# Patient Record
Sex: MALE | Race: WHITE | NOT HISPANIC OR LATINO | Employment: UNEMPLOYED | URBAN - METROPOLITAN AREA
[De-identification: names, ages, dates, MRNs, and addresses within clinical notes are randomized per-mention and may not be internally consistent; named-entity substitution may affect disease eponyms.]

---
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
## 2021-05-11 ENCOUNTER — IMMUNIZATIONS (OUTPATIENT)
Dept: FAMILY MEDICINE CLINIC | Facility: HOSPITAL | Age: 21
End: 2021-05-11

## 2021-05-11 DIAGNOSIS — Z23 ENCOUNTER FOR IMMUNIZATION: Primary | ICD-10-CM

## 2021-05-11 PROCEDURE — 0001A SARS-COV-2 / COVID-19 MRNA VACCINE (PFIZER-BIONTECH) 30 MCG: CPT

## 2021-05-11 PROCEDURE — 91300 SARS-COV-2 / COVID-19 MRNA VACCINE (PFIZER-BIONTECH) 30 MCG: CPT

## 2021-06-03 ENCOUNTER — IMMUNIZATIONS (OUTPATIENT)
Dept: FAMILY MEDICINE CLINIC | Facility: HOSPITAL | Age: 21
End: 2021-06-03

## 2021-06-03 DIAGNOSIS — Z23 ENCOUNTER FOR IMMUNIZATION: Primary | ICD-10-CM

## 2021-06-03 PROCEDURE — 0002A SARS-COV-2 / COVID-19 MRNA VACCINE (PFIZER-BIONTECH) 30 MCG: CPT

## 2021-06-03 PROCEDURE — 91300 SARS-COV-2 / COVID-19 MRNA VACCINE (PFIZER-BIONTECH) 30 MCG: CPT

## 2022-12-23 ENCOUNTER — OFFICE VISIT (OUTPATIENT)
Dept: URGENT CARE | Facility: CLINIC | Age: 22
End: 2022-12-23

## 2022-12-23 VITALS
WEIGHT: 134 LBS | OXYGEN SATURATION: 98 % | SYSTOLIC BLOOD PRESSURE: 113 MMHG | HEART RATE: 90 BPM | RESPIRATION RATE: 16 BRPM | TEMPERATURE: 98 F | DIASTOLIC BLOOD PRESSURE: 72 MMHG | BODY MASS INDEX: 22.88 KG/M2 | HEIGHT: 64 IN

## 2022-12-23 DIAGNOSIS — H92.03 OTALGIA OF BOTH EARS: ICD-10-CM

## 2022-12-23 DIAGNOSIS — J02.9 SORE THROAT: Primary | ICD-10-CM

## 2022-12-23 PROBLEM — J30.2 SEASONAL ALLERGIES: Status: ACTIVE | Noted: 2022-12-23

## 2022-12-23 LAB — S PYO AG THROAT QL: NEGATIVE

## 2022-12-23 NOTE — PROGRESS NOTES
[facility]        NAME: Oly Somers is a 25 y o  male  : 2000    MRN: [id number]  DATE: 2022  TIME: 2:02 PM    Assessment and Plan   Sore throat [J02 9]  1  Sore throat  POCT rapid strepA    Throat culture      2  Otalgia of both ears              Patient Instructions     Patient Instructions   Rapid strep negative  Will send throat culture  If positive, will call you and discuss antibiotics  Fluids and rest  Cool mist humidifier at night  Salt water gargles and chloraseptic spray  Tea with honey  Tylenol/Ibuprofen for pain/fever  Follow up with PCP if symptoms do not improve or worsen  Follow up with PCP in 3-5 days  Proceed to  ER if symptoms worsen  Chief Complaint     Chief Complaint   Patient presents with   • Earache     10 days, ear pain, sore throat  Tylenol/ motrin  History of Present Illness       HPI   Oly Somers is a 25 y o  male who presents for evaluation of b/l ear pain and sore throat that started 10 days ago  He had a cough and congestion last week, but has since resolved  Denies fevers, chills, SOB, wheezing, abdominal pain, N/V/D  He has been taking Tylenol/Motrin for his symptoms with mild relief  He reports multiple family members sick with URI symptoms  He is traveling to [facility] in 4 days and just wanted to be checked  Review of Systems   Review of Systems   Constitutional: Negative for chills, fatigue and fever  HENT: Positive for ear pain and sore throat  Negative for congestion, rhinorrhea and sinus pain  Respiratory: Negative for cough, chest tightness, shortness of breath and wheezing  Cardiovascular: Negative for chest pain and palpitations  Gastrointestinal: Negative for abdominal pain, diarrhea, nausea and vomiting  Musculoskeletal: Negative for arthralgias and myalgias  Skin: Negative for color change and rash  Neurological: Negative for weakness, light-headedness and headaches           Current Medications Current Outpatient Medications:   •  albuterol (PROVENTIL HFA,VENTOLIN HFA) 90 mcg/act inhaler, Inhale 2 puffs every 4 (four) hours as needed for wheezing (Patient not taking: Reported on 12/23/2022), Disp: 1 Inhaler, Rfl: 0    Current Allergies     Allergies as of 12/23/2022   • (No Known Allergies)            The following portions of the patient's history were reviewed and updated as appropriate: allergies, current medications, past family history, past medical history, past social history, past surgical history and problem list      Past Medical History:   Diagnosis Date   • No pertinent past medical history        Past Surgical History:   Procedure Laterality Date   • ADENOIDECTOMY     • TONSILLECTOMY     • WISDOM TOOTH EXTRACTION         History reviewed  No pertinent family history  Medications have been verified  Objective   /72   Pulse 90   Temp 98 °F (36 7 °C)   Resp 16   Ht 5' 4" (1 626 m)   Wt 60 8 kg (134 lb)   SpO2 98%   BMI 23 00 kg/m²        Physical Exam     Physical Exam  Vitals and nursing note reviewed  Constitutional:       General: He is not in acute distress  Appearance: Normal appearance  HENT:      Head: Normocephalic and atraumatic  Right Ear: Tympanic membrane and ear canal normal       Left Ear: Tympanic membrane and ear canal normal       Nose: Nose normal       Mouth/Throat:      Mouth: Mucous membranes are moist       Pharynx: Posterior oropharyngeal erythema present  No oropharyngeal exudate  Comments: H/o tonsillectomy  Eyes:      Conjunctiva/sclera: Conjunctivae normal       Pupils: Pupils are equal, round, and reactive to light  Cardiovascular:      Rate and Rhythm: Normal rate and regular rhythm  Pulses: Normal pulses  Heart sounds: Normal heart sounds  No murmur heard  Pulmonary:      Effort: Pulmonary effort is normal       Breath sounds: Normal breath sounds  No wheezing, rhonchi or rales     Musculoskeletal: Cervical back: Normal range of motion and neck supple  Lymphadenopathy:      Cervical: No cervical adenopathy  Skin:     General: Skin is warm and dry  Neurological:      Mental Status: He is alert

## 2022-12-23 NOTE — PATIENT INSTRUCTIONS
Rapid strep negative  Will send throat culture  If positive, will call you and discuss antibiotics  Fluids and rest  Cool mist humidifier at night  Salt water gargles and chloraseptic spray  Tea with honey  Tylenol/Ibuprofen for pain/fever  Follow up with PCP if symptoms do not improve or worsen

## 2022-12-25 LAB — BACTERIA THROAT CULT: NORMAL

## 2023-11-22 ENCOUNTER — OFFICE VISIT (OUTPATIENT)
Dept: URGENT CARE | Facility: CLINIC | Age: 23
End: 2023-11-22
Payer: COMMERCIAL

## 2023-11-22 VITALS
HEART RATE: 81 BPM | TEMPERATURE: 99 F | WEIGHT: 135 LBS | DIASTOLIC BLOOD PRESSURE: 76 MMHG | RESPIRATION RATE: 18 BRPM | OXYGEN SATURATION: 98 % | BODY MASS INDEX: 23.17 KG/M2 | SYSTOLIC BLOOD PRESSURE: 120 MMHG

## 2023-11-22 DIAGNOSIS — R10.13 EPIGASTRIC PAIN: Primary | ICD-10-CM

## 2023-11-22 PROCEDURE — 99213 OFFICE O/P EST LOW 20 MIN: CPT | Performed by: PHYSICIAN ASSISTANT

## 2023-11-22 RX ORDER — FAMOTIDINE 20 MG/1
20 TABLET, FILM COATED ORAL 2 TIMES DAILY
Qty: 20 TABLET | Refills: 0 | Status: SHIPPED | OUTPATIENT
Start: 2023-11-22 | End: 2023-12-02

## 2023-11-22 NOTE — PROGRESS NOTES
[facility]        NAME: Valdemar Eng is a 21 y.o. male  : 2000    MRN: [id number]  DATE: 2023  TIME: 11:01 AM    Assessment and Plan   Epigastric pain [R10.13]  1. Epigastric pain  famotidine (PEPCID) 20 mg tablet            Patient Instructions     Epigastric pain  Pepcid as directed  Follow-up with primary care doctor  Follow up with PCP in 3-5 days. Proceed to  ER if symptoms worsen. Chief Complaint     Chief Complaint   Patient presents with    Abdominal Pain     Upper central abdominal discomfort constant yesterday for 2-3 hours then went away. States its more right sided. Denies nausea and vomiting. Pains started a week ago on and off. Not taking anything for the discomfort. Pain rated 4-5/10 currently         History of Present Illness       27-year-old male who presents complaining of epigastric pain that is intermittent in nature. Patient does not recall any inciting factors. Denies nausea, vomiting, fever, chills, urinary symptoms    Abdominal Pain        Review of Systems   Review of Systems   Gastrointestinal:  Positive for abdominal pain.          Current Medications       Current Outpatient Medications:     famotidine (PEPCID) 20 mg tablet, Take 1 tablet (20 mg total) by mouth 2 (two) times a day for 10 days, Disp: 20 tablet, Rfl: 0    albuterol (PROVENTIL HFA,VENTOLIN HFA) 90 mcg/act inhaler, Inhale 2 puffs every 4 (four) hours as needed for wheezing (Patient not taking: Reported on 2022), Disp: 1 Inhaler, Rfl: 0    Current Allergies     Allergies as of 2023 - Reviewed 2023   Allergen Reaction Noted    Pollen extract Allergic Rhinitis 2018            The following portions of the patient's history were reviewed and updated as appropriate: allergies, current medications, past family history, past medical history, past social history, past surgical history and problem list.     Past Medical History:   Diagnosis Date    No pertinent past medical history        Past Surgical History:   Procedure Laterality Date    ADENOIDECTOMY      TONSILLECTOMY      WISDOM TOOTH EXTRACTION         History reviewed. No pertinent family history. Medications have been verified. Objective   /76   Pulse 81   Temp 99 °F (37.2 °C)   Resp 18   Wt 61.2 kg (135 lb)   SpO2 98%   BMI 23.17 kg/m²        Physical Exam     Physical Exam  Constitutional:       General: He is not in acute distress. Appearance: He is well-developed. He is not diaphoretic. Cardiovascular:      Rate and Rhythm: Normal rate and regular rhythm. Heart sounds: Normal heart sounds. Pulmonary:      Effort: Pulmonary effort is normal. No respiratory distress. Breath sounds: Normal breath sounds. No wheezing or rales. Chest:      Chest wall: No tenderness. Abdominal:      General: Abdomen is flat. Palpations: Abdomen is soft. Tenderness: There is abdominal tenderness in the epigastric area. There is no right CVA tenderness, left CVA tenderness, guarding or rebound. Negative signs include Moseley's sign, Rovsing's sign, McBurney's sign and psoas sign. Musculoskeletal:      Cervical back: Normal range of motion and neck supple. Lymphadenopathy:      Cervical: No cervical adenopathy.

## 2023-11-22 NOTE — PATIENT INSTRUCTIONS
Epigastric pain  Pepcid as directed  Follow-up with primary care doctor  Follow up with PCP in 3-5 days. Proceed to  ER if symptoms worsen. Abdominal Binder   WHAT YOU NEED TO KNOW:   An abdominal binder is fitted elastic material that goes around your abdomen. It may be used to support your muscles. It also keeps bandages in place, or helps incisions heal after abdominal or pelvic surgery. DISCHARGE INSTRUCTIONS:   Contact your healthcare provider if:   Your abdominal binder will not stay in place or frequently comes apart. The skin under your abdominal binder is red, raw, or blistered. You have questions about how to wear your abdominal binder correctly. Important things to know about an abdominal binder:   Wear your abdominal binder as directed. Do not take it off until your healthcare provider says it is okay to do so. Use the right size abdominal binder. The abdominal binder may irritate your skin or not work correctly if it is too big or too small. Your healthcare provider will show you the correct size to wear. Care for your skin and incision under your abdominal binder. Remove your abdominal binder as directed to clean your skin and incision. Wash your hands before you touch the incision. Clean your skin and change bandages as directed. Check your skin for redness, warmth, swelling, or numbness. Care for your abdominal binder. Follow the directions on the label to clean your abdominal binder. You may be able to hand wash it in cold water and hang it to dry. How to use an abdominal binder:   Place your abdominal binder around your abdomen. Place it over bandages and under your clothes unless you are told differently by your healthcare provider. Fasten the 2 sides of your abdominal binder  as directed. Start from the bottom and work up if you had a tummy tuck or other abdominal surgery.  If you had a , your healthcare provider may tell you to begin at the top and fasten down. Make sure the abdominal binder fits comfortably. It should be snug but not too tight. Adjust it as needed. Make sure it is even and that there are no wrinkles. Make sure that you can breathe normally and go the bathroom. Make sure any tubes or drains are not pinched and fluid can empty. © Copyright [employer] 2023 Information is for End User's use only and may not be sold, redistributed or otherwise used for commercial purposes. The above information is an  only. It is not intended as medical advice for individual conditions or treatments. Talk to your doctor, nurse or pharmacist before following any medical regimen to see if it is safe and effective for you.